# Patient Record
Sex: MALE | Race: WHITE
[De-identification: names, ages, dates, MRNs, and addresses within clinical notes are randomized per-mention and may not be internally consistent; named-entity substitution may affect disease eponyms.]

---

## 2019-01-15 ENCOUNTER — HOSPITAL ENCOUNTER (OUTPATIENT)
Dept: HOSPITAL 25 - OR | Age: 69
Discharge: HOME | End: 2019-01-15
Attending: NEUROLOGICAL SURGERY
Payer: MEDICARE

## 2019-01-15 VITALS — DIASTOLIC BLOOD PRESSURE: 61 MMHG | SYSTOLIC BLOOD PRESSURE: 116 MMHG

## 2019-01-15 DIAGNOSIS — R11.10: ICD-10-CM

## 2019-01-15 DIAGNOSIS — Z53.9: Primary | ICD-10-CM

## 2019-01-15 DIAGNOSIS — E66.2: ICD-10-CM

## 2019-01-15 NOTE — CONSULT
Consult


Consult: 





Mr. Marsh is a 69yo gentleman with PMH significant for smoking, SOB, 

morbid obesity, Pickwickian syndrome, and asthma who presented this morning for 

an L4-5, L5-S1 lumbar laminectomy.  I cancelled his case after he was vomiting 

in the per-op holding area.  In discussing my concerns with him and his family 

we discussed the pulmonologist's recommendation that he get a sleep study given 

his probable obesity hypoventilation syndrome and concern for opioid 

requirement and other potential respiratory depressants in the post operative 

period.  We discussed the potential complications of respiratory depressants in 

the setting of obesity hyperventilation syndrome including death.  It is 

documented in the pulmonology note that the pt had refused a sleep study 

because he did not want it to delay his surgery.  We then discussed his 

vomiting.  He attributed it to "nerves" and "car sickness".  Unable to be 

certain that this is not a prodrome of another illness which could further 

compromise his precarious respiratory status, I decided to cancel the case.  He 

was very upset.  And said that he was going to go home and shoot himself.  I 

asked him if he was serious, and recommended that he go to the ED and see 

psychiatrist if that really is his intent.  He refused and denied that that was 

really his intent.  I also recommended that he go see his PCP in the setting of 

his new onset vomiting.  Again, he refused and said that he was "done with 

doctors and just wanted to go home."





- Gricelda Forrester, DO

## 2019-02-05 ENCOUNTER — HOSPITAL ENCOUNTER (INPATIENT)
Dept: HOSPITAL 25 - OR | Age: 69
LOS: 2 days | Discharge: HOME | DRG: 517 | End: 2019-02-07
Attending: NEUROLOGICAL SURGERY | Admitting: NEUROLOGICAL SURGERY
Payer: MEDICARE

## 2019-02-05 DIAGNOSIS — Z80.0: ICD-10-CM

## 2019-02-05 DIAGNOSIS — R60.0: ICD-10-CM

## 2019-02-05 DIAGNOSIS — F17.210: ICD-10-CM

## 2019-02-05 DIAGNOSIS — E66.01: ICD-10-CM

## 2019-02-05 DIAGNOSIS — G47.33: ICD-10-CM

## 2019-02-05 DIAGNOSIS — Z82.49: ICD-10-CM

## 2019-02-05 DIAGNOSIS — Z90.89: ICD-10-CM

## 2019-02-05 DIAGNOSIS — M48.061: Primary | ICD-10-CM

## 2019-02-05 DIAGNOSIS — D69.6: ICD-10-CM

## 2019-02-05 DIAGNOSIS — R74.0: ICD-10-CM

## 2019-02-05 DIAGNOSIS — Z83.3: ICD-10-CM

## 2019-02-05 DIAGNOSIS — Z91.048: ICD-10-CM

## 2019-02-05 DIAGNOSIS — N40.0: ICD-10-CM

## 2019-02-05 DIAGNOSIS — Z80.6: ICD-10-CM

## 2019-02-05 DIAGNOSIS — I10: ICD-10-CM

## 2019-02-05 PROCEDURE — 82746 ASSAY OF FOLIC ACID SERUM: CPT

## 2019-02-05 PROCEDURE — 84155 ASSAY OF PROTEIN SERUM: CPT

## 2019-02-05 PROCEDURE — 01NR0ZZ RELEASE SACRAL NERVE, OPEN APPROACH: ICD-10-PCS | Performed by: NEUROLOGICAL SURGERY

## 2019-02-05 PROCEDURE — 80053 COMPREHEN METABOLIC PANEL: CPT

## 2019-02-05 PROCEDURE — 72100 X-RAY EXAM L-S SPINE 2/3 VWS: CPT

## 2019-02-05 PROCEDURE — 82607 VITAMIN B-12: CPT

## 2019-02-05 PROCEDURE — 36415 COLL VENOUS BLD VENIPUNCTURE: CPT

## 2019-02-05 PROCEDURE — 84165 PROTEIN E-PHORESIS SERUM: CPT

## 2019-02-05 PROCEDURE — 85025 COMPLETE CBC W/AUTO DIFF WBC: CPT

## 2019-02-05 PROCEDURE — 01NB0ZZ RELEASE LUMBAR NERVE, OPEN APPROACH: ICD-10-PCS | Performed by: NEUROLOGICAL SURGERY

## 2019-02-05 RX ADMIN — FENTANYL CITRATE PRN MCG: 0.05 INJECTION, SOLUTION INTRAMUSCULAR; INTRAVENOUS at 16:10

## 2019-02-05 RX ADMIN — HYDROCODONE BITARTRATE AND ACETAMINOPHEN PRN TAB: 5; 325 TABLET ORAL at 17:44

## 2019-02-05 RX ADMIN — FENTANYL CITRATE PRN MCG: 0.05 INJECTION, SOLUTION INTRAMUSCULAR; INTRAVENOUS at 16:25

## 2019-02-05 RX ADMIN — NICOTINE SCH: 21 PATCH TRANSDERMAL at 19:57

## 2019-02-05 RX ADMIN — HYDROCODONE BITARTRATE AND ACETAMINOPHEN PRN TAB: 5; 325 TABLET ORAL at 21:49

## 2019-02-05 RX ADMIN — FENTANYL CITRATE PRN MCG: 0.05 INJECTION, SOLUTION INTRAMUSCULAR; INTRAVENOUS at 17:32

## 2019-02-05 RX ADMIN — FENTANYL CITRATE PRN MCG: 0.05 INJECTION, SOLUTION INTRAMUSCULAR; INTRAVENOUS at 17:01

## 2019-02-05 RX ADMIN — FENTANYL CITRATE PRN MCG: 0.05 INJECTION, SOLUTION INTRAMUSCULAR; INTRAVENOUS at 16:15

## 2019-02-06 RX ADMIN — LOSARTAN POTASSIUM SCH MG: 25 TABLET, FILM COATED ORAL at 08:37

## 2019-02-06 RX ADMIN — HYDROCODONE BITARTRATE AND ACETAMINOPHEN PRN TAB: 5; 325 TABLET ORAL at 18:18

## 2019-02-06 RX ADMIN — TAMSULOSIN HYDROCHLORIDE SCH MG: 0.4 CAPSULE ORAL at 08:37

## 2019-02-06 RX ADMIN — NICOTINE SCH: 21 PATCH TRANSDERMAL at 08:37

## 2019-02-06 RX ADMIN — DOCUSATE SODIUM SCH MG: 100 CAPSULE, LIQUID FILLED ORAL at 08:37

## 2019-02-06 RX ADMIN — HYDROCODONE BITARTRATE AND ACETAMINOPHEN PRN TAB: 5; 325 TABLET ORAL at 05:46

## 2019-02-06 RX ADMIN — FUROSEMIDE SCH MG: 40 TABLET ORAL at 08:37

## 2019-02-06 NOTE — PN
Subjective


Date of Service: 02/06/19


Interval History: 





Back pain much better since surgery.  No new c/o.





Objective


Active Medications: 








Acetaminophen (Tylenol Tab*)  650 mg PO Q4H PRN


   PRN Reason: PAIN


Hydrocodone Bitart/Acetaminophen (Norco 5-325 Tab*)  2 tab PO Q4H PRN


   PRN Reason: marked pain


   Last Admin: 02/06/19 05:46 Dose:  2 tab


Docusate Sodium (Colace Cap*)  [facility]


   Last Admin: 02/06/19 08:37 Dose:  200 mg


Furosemide (Lasix Tab*)  40 [facility]


   Last Admin: 02/06/19 08:37 Dose:  40 mg


Lactated Ringer's (Lactated Ringers 1000 Ml Bag*)  1,000 mls @ 75 mls/hr IV 

.per rate [facility]


   Last Admin: 02/05/19 21:51 Dose:  75 mls/hr


Losartan Potassium (Cozaar Tab*)  50 [facility]


   Last Admin: 02/06/19 08:37 Dose:  50 mg


Magnesium Hydroxide (Milk Of Magnesia Liq*)  30 ml PO DAILY PRN


   PRN Reason: CONSTIPATION


Morphine Sulfate (Morphine Inj ((Syringe))*)  3 mg IV Q4H PRN


   PRN Reason: PAIN - MILD


Nicotine (Nicotine Inhaler*)  10 mg INH Q2H PRN


   PRN Reason: CRAVING


Nicotine (Nicotine Patch 21 Mg/24 Hr*)  1 patch TRANSDERM DAILY [facility]


   Last Admin: 02/06/19 08:37 Dose:  Not Given


Ondansetron HCl (Zofran Inj*)  4 mg IV Q6H PRN


   PRN Reason: NAUSEA/VOMITING


Pharmacy Profile Note (Nicotine Patch Removal Note*)  1 note PATCH OFF 2100 [facility]


Polyethylene Glycol/Electrolytes (Miralax*)  17 gm PO QAM PRN


   PRN Reason: CONSTIPATION


Tamsulosin HCl (Flomax Cap*)  0.4 mg [facility]


   Last Admin: 02/06/19 08:37 Dose:  0.4 mg








 Vital Signs - 8 hr











  02/06/19 02/06/19 02/06/19





  07:18 08:00 08:24


 


Temperature 98.0 F  


 


Pulse Rate 78  


 


Respiratory 20 18 18





Rate   


 


Blood Pressure 128/58  





(mmHg)   


 


O2 Sat by Pulse 99  





Oximetry   














  02/06/19





  12:27


 


Temperature 98.1 F


 


Pulse Rate 73


 


Respiratory 20





Rate 


 


Blood Pressure 123/46





(mmHg) 


 


O2 Sat by Pulse 98





Oximetry 











Oxygen Devices in Use Now: None


Appearance: Alert, in recliner chair.  In good spirits.  Looks comfortable.


Eyes: No Scleral Icterus


Neurological: Alert and Oriented x 3, NL Sensation





Assess/Plan/Problems-Billing


Assessment: 











- Patient Problems


(1) HTN (hypertension)


Current Visit: Yes   Status: Acute   Code(s): I10 - ESSENTIAL (PRIMARY) 

HYPERTENSION   SNOMED Code(s): 55854793


   Comment: Continue losartan home dose.    





(2) ABBEY (obstructive sleep apnea)


Current Visit: Yes   Status: Acute   Code(s): G47.33 - OBSTRUCTIVE SLEEP APNEA (

ADULT) (PEDIATRIC)   SNOMED Code(s): 25248276


   Comment: Not a definite diagnosis--has not yet had sleep lab study.   





(3) Morbid obesity


Current Visit: Yes   Status: Acute   Code(s): E66.01 - MORBID (SEVERE) OBESITY 

DUE TO EXCESS CALORIES   SNOMED Code(s): 329539702


   Comment: BMI 53.1.   





(4) Acquired thrombocytopenia


Current Visit: Yes   Status: Acute   Code(s): D69.6 - THROMBOCYTOPENIA, 

UNSPECIFIED   SNOMED Code(s): 06524932


   Comment: Since at least 6/2018.  Patient not aware of this diagnosis, has 

not seen a hematologist.  CBC, B12, folate level 2/7.  Note .   





(5) Elevated transaminase level


Current Visit: Yes   Status: Acute   Code(s): R74.0 - NONSPEC ELEV OF LEVELS OF 

TRANSAMNS & LACTIC ACID DEHYDRGNSE   SNOMED Code(s): 846228460


   Comment: Mild elevation 2018.  Repeat CMP 2/7.

## 2019-02-06 NOTE — CONS
[facility] MEDICINE CONSULTATION REPORT:

 

DATE OF CONSULT:  02/05/19

 

PROVIDER:  Tona Garza NP

 

ATTENDING PHYSICIAN:  Dr. Garcia.

 

CONSULTING PHYSICIAN:  Dr. Santosh Kimble (dictated by Tona Garza NP)

 

REASON FOR CONSULT:  Co-management of chronic medical conditions.

 

HISTORY OF PRESENT ILLNESS:  Mr. Marsh is a 68-year-old male with a past 
medical history significant for hypertension, obstructive sleep apnea, enlarged 
prostate, lumbar stenosis, morbid obesity, and tobacco abuse, who presented to 
[facility] [place] for an elective lumbar laminectomy with Dr. Garcia.  For complete details, 
please see dictated H and P from Dr. Garcia and ALEJANDRO Rader.  In brief, 
the patient had ongoing pain and weakness in his lower extremities and opted 
for an elective lumbar laminectomy with Dr. Garcia.  Due to his history of 
hypertension, morbid obesity, and BPH, we were asked to see and evaluate him to 
help co-manage his chronic medical conditions.

 

PAST MEDICAL HISTORY:

1.  Hypertension.

2.  Obstructive sleep apnea.

3.  BPH.

4.  Lower extremity edema.

5.  Lumbar stenosis.

6.  Morbid obesity.

 

PAST SURGICAL HISTORY:

1.  Tonsillectomy.

2.  Lumbar surgery in 1996 and 2011.

3.  Cervical surgery in 2015.

4.  Appendectomy in 1980.

5.  Left ear surgery.

 

HOME MEDICATIONS:

1.  Tamsulosin 0.4 mg p.o. q.a.m.

2.  MiraLAX 17 g p.o. q.a.m. as needed.

3.  Oxycodone/acetaminophen 1 tablet p.o. q.6 hours as needed.

4.  Zofran 4 mg p.o. daily p.r.n. nausea or vomiting.

5.  Losartan 50 mg p.o. q.a.m.

6.  Ibuprofen 800 mg p.o. q.6 hours as needed for pain.

7.  Furosemide 20 mg p.o. q.a.m.

8.  Docusate 200 mg p.o. q.a.m.

 

ALLERGIES:  Allergy to ADHESIVE TAPE.

 

FAMILY HISTORY:  Father with a history of an MI.  Mother with a history of CHF. 
Father and sister both with diabetes and cancer.  Mother with colon cancer.  
Father with leukemia and sister with pancreatic cancer.

 

SOCIAL HISTORY:  The patient smokes 1 pack per day.  He uses alcohol rarely. 
Denies any illicit drug use.  He is .  Surrogate decision maker in the 
event he is unable to make his own decisions is his wife.  Code status, he is a 
full code.

 

REVIEW OF SYSTEMS:  General:  There is no fever, chills, or unintended weight 
loss. He denies any chest pain or edema.  Denies any cough, hemoptysis, or 
shortness of breath.  Denies any nausea, vomiting, or diarrhea.  Denies any 
abdominal pain. Denies any gross hematuria or dysuria.  Neurologic:  He denies 
any weakness or sensory loss.  Denies any dysphagia.  He does complain of lower 
back pain.  No rashes, lesions, or open sores.  Denies any depression or 
anxiety.

 

PHYSICAL EXAM:  Vital Signs:  Blood pressure 126/61, heart rate 70, 
respirations are 21, O2 saturation 96% on room air, temperature is 98.5.  HEENT
:  Head is atraumatic, normocephalic.  Eyes:  EOMs are intact.  Sclerae 
anicteric and not pale.  Oral mucosa appeared to be moist.  Neck is supple.  
Lungs are clear to auscultation bilaterally.  No wheezes, rales, or rhonchi.  
Cardiac:  S1, S2. Regular rate and rhythm.  No murmurs, rubs, or gallops.  
Abdomen is rounded, obese, soft.  Bowel sounds are present x4.  Extremities:  
Pedal pulses are +2 bilaterally. He is able to move all 4 extremities with 5/5 
strength.  Skin:  He does have a dressing to his lower lumbar spine.  Neurologic
:  He is drowsy sitting in a chair in the recovery room.  He does not appear to 
be in any acute distress.

 

LABORATORY DATA:  Lab work from 01/29/19, WBCs are ______, RBCs 3.85, 
hemoglobin was 13.5, hematocrit was 39, platelet count was 100.  Sodium of 139, 
potassium of 4.0, chloride 110, carbon dioxide was 22, anion gap was 7, BUN was 
16, creatinine 0.92, glucose was 95, calcium 9.0.

 

IMPRESSION AND PLAN:  Mr. Marsh is a 68-year-old male with a past medical 
history significant for hypertension, benign prostatic hypertrophy, lower 
extremity edema, morbid obesity, lumbar stenosis who presented to [facility] [place] for an 
elective lumbar laminectomy with Dr. Garcia.  In the immediate postoperative 
period, the patient has no complaints.  Our recommendations are as follows:

 

1.  Status post lumbar laminectomy.  Management per Neurosurgery.  PT/OT per 
Neurosurgery.  Pain Management per Neurosurgery.

2. Hypertension. I will continue on losartan as previously prescribed with 
holding parameters with systolic blood pressure less than 120.

3.  Lower extremity edema.  He can continue on Lasix 40 mg p.o. q.a.m.

4.  Benign prostatic hypertrophy.  He should continue on Flomax 0.4 mg p.o. 
q.a.m. as previously prescribed.

5.  Tobacco abuse.  The patient can have nicotine inhaler and nicotine patch.

6.  FEN.  The patient should have a heart-healthy, caffeine okay diet.

7.  Code status.  He is a full code.

8.  DVT prophylaxis, per Neurosurgery.

 

TIME SPENT:  Time spent on this consultation was 45 minutes, greater than half 
that time was spent at the bedside reviewing events leading thus far to his 
hospitalization, performing my physical exam, and reviewing my plan of care, 
the other half of the time was spent implementing my plan of care.

 

I have discussed this with my attending, Dr. Santosh Kimble; he is in agreement with 
my plan.

 

____________________________________ TONA GARZA, NP

 

[id number]/[facility] #: [id number]

[place]

## 2019-02-06 NOTE — PN
Progress Note





- Progress Note


Date of Service: 02/06/19


SOAP: 


Subjective:


[]POD # 1


C/O noise from monitors last night


Legs much better than pre op


Moderate incisional pain








Objective:


[]Dressing dry


Moderate drain output


Neuro intact








Assessment:


[]Improved








Plan:


[]


Post op drain output requires further monitoring

## 2019-02-07 VITALS — DIASTOLIC BLOOD PRESSURE: 65 MMHG | SYSTOLIC BLOOD PRESSURE: 125 MMHG

## 2019-02-07 LAB
ALBUMIN SERPL BCG-MCNC: 3 G/DL (ref 3.2–5.2)
ALBUMIN/GLOB SERPL: 1 {RATIO} (ref 1–3)
ALP SERPL-CCNC: 57 U/L (ref 34–104)
ALT SERPL W P-5'-P-CCNC: 162 U/L (ref 7–52)
ANION GAP SERPL CALC-SCNC: 5 MMOL/L (ref 2–11)
AST SERPL-CCNC: 211 U/L (ref 13–39)
BASOPHILS # BLD AUTO: 0 10^3/UL (ref 0–0.2)
BUN SERPL-MCNC: 31 MG/DL (ref 6–24)
BUN/CREAT SERPL: 35.6 (ref 8–20)
CALCIUM SERPL-MCNC: 8.4 MG/DL (ref 8.6–10.3)
CHLORIDE SERPL-SCNC: 108 MMOL/L (ref 101–111)
EOSINOPHIL # BLD AUTO: 0.2 10^3/UL (ref 0–0.6)
FOLATE SERPL-MCNC: 15.53 NG/ML (ref 3.99–?)
GLOBULIN SER CALC-MCNC: 3 G/DL (ref 2–4)
GLUCOSE SERPL-MCNC: 110 MG/DL (ref 70–100)
HCO3 SERPL-SCNC: 22 MMOL/L (ref 22–32)
HCT VFR BLD AUTO: 32 % (ref 42–52)
HGB BLD-MCNC: 10.9 G/DL (ref 14–18)
LYMPHOCYTES # BLD AUTO: 1.4 10^3/UL (ref 1–4.8)
MCH RBC QN AUTO: 35 PG (ref 27–31)
MCHC RBC AUTO-ENTMCNC: 34 G/DL (ref 31–36)
MCV RBC AUTO: 102 FL (ref 80–94)
MONOCYTES # BLD AUTO: 0.7 10^3/UL (ref 0–0.8)
NEUTROPHILS # BLD AUTO: 4.7 10^3/UL (ref 1.5–7.7)
NRBC # BLD AUTO: 0 10^3/UL
NRBC BLD QL AUTO: 0.1
PLATELET # BLD AUTO: 74 10^3/UL (ref 150–450)
POTASSIUM SERPL-SCNC: 4 MMOL/L (ref 3.5–5)
PROT SERPL-MCNC: 6 G/DL (ref 6.4–8.9)
RBC # BLD AUTO: 3.11 10^6/UL (ref 4–5.4)
SODIUM SERPL-SCNC: 135 MMOL/L (ref 135–145)
WBC # BLD AUTO: 7.1 10^3/UL (ref 3.5–10.8)

## 2019-02-07 RX ADMIN — TAMSULOSIN HYDROCHLORIDE SCH MG: 0.4 CAPSULE ORAL at 08:32

## 2019-02-07 RX ADMIN — FUROSEMIDE SCH MG: 40 TABLET ORAL at 08:32

## 2019-02-07 RX ADMIN — DOCUSATE SODIUM SCH MG: 100 CAPSULE, LIQUID FILLED ORAL at 08:32

## 2019-02-07 RX ADMIN — LOSARTAN POTASSIUM SCH MG: 25 TABLET, FILM COATED ORAL at 08:32

## 2019-02-07 RX ADMIN — NICOTINE SCH: 21 PATCH TRANSDERMAL at 08:09

## 2019-02-07 NOTE — PN
Subjective


Date of Service: 02/07/19


Interval History: 





Feeling well, good pain control, anxious to go home. 





Objective


Active Medications: 








Acetaminophen (Tylenol Tab*)  650 mg PO Q4H PRN


   PRN Reason: PAIN


Hydrocodone Bitart/Acetaminophen (Norco 5-325 Tab*)  2 tab PO Q4H PRN


   PRN Reason: marked pain


   Last Admin: 02/06/19 18:18 Dose:  2 tab


Docusate Sodium (Colace Cap*)  [facility] [place] [facility]


   Last Admin: 02/07/19 08:32 Dose:  200 mg


Furosemide (Lasix Tab*)  40 [facility] PO [facility] [place] [facility]


   Last Admin: 02/07/19 08:32 Dose:  40 mg


Lactated Ringer's (Lactated Ringers 1000 Ml Bag*)  1,000 mls @ 75 mls/hr IV 

.per rate [facility]


   Last Admin: 02/05/19 21:51 Dose:  75 mls/hr


Losartan Potassium (Cozaar Tab*)  50 mg PO [facility]


   Last Admin: 02/07/19 08:32 Dose:  50 mg


Magnesium Hydroxide (Milk Of Magnesia Liq*)  30 ml PO DAILY PRN


   PRN Reason: CONSTIPATION


Morphine Sulfate (Morphine Inj ((Syringe))*)  3 mg IV Q4H PRN


   PRN Reason: PAIN - MILD


Nicotine (Nicotine Inhaler*)  10 mg INH Q2H PRN


   PRN Reason: CRAVING


Nicotine (Nicotine Patch 21 Mg/24 Hr*)  1 patch TRANSDERM DAILY [facility]


   Last Admin: 02/07/19 08:09 Dose:  Not Given


Ondansetron HCl (Zofran Inj*)  4 mg IV Q6H PRN


   PRN Reason: NAUSEA/VOMITING


Pharmacy Profile Note (Nicotine Patch Removal Note*)  1 note PATCH OFF 2100 [facility]


   Last Admin: 02/06/19 22:05 Dose:  Not Given


Polyethylene Glycol/Electrolytes (Miralax*)  17 gm PO QAM PRN


   PRN Reason: CONSTIPATION


Tamsulosin HCl (Flomax Cap*)  0.4 mg PO [facility]


   Last Admin: 02/07/19 08:32 Dose:  0.4 mg








 Vital Signs - 8 hr











  02/07/19 02/07/19





  04:23 07:47


 


Temperature 98.0 F 97.8 F


 


Pulse Rate 67 80


 


Respiratory 16 16





Rate  


 


Blood Pressure 120/53 125/65





(mmHg)  


 


O2 Sat by Pulse 99 98





Oximetry  











Oxygen Devices in Use Now: None


Appearance: Alert, in a chair.  In good spirits.  Looks comfortable.


Neurological: Alert and Oriented x 3, NL Sensation


Result Diagrams: 


 02/07/19 05:08





 02/07/19 05:08





Assess/Plan/Problems-Billing


Assessment: 











- Patient Problems


(1) HTN (hypertension)


Current Visit: Yes   Status: Acute   Code(s): I10 - ESSENTIAL (PRIMARY) 

HYPERTENSION   SNOMED Code(s): 31336254


   Comment: Continue losartan home dose.    





(2) ABBYE (obstructive sleep apnea)


Current Visit: Yes   Status: Acute   Code(s): G47.33 - OBSTRUCTIVE SLEEP APNEA (

ADULT) (PEDIATRIC)   SNOMED Code(s): 26337607


   Comment: Not a definite diagnosis--has not yet had sleep lab study.   





(3) Morbid obesity


Current Visit: Yes   Status: Acute   Code(s): E66.01 - MORBID (SEVERE) OBESITY 

DUE TO EXCESS CALORIES   SNOMED Code(s): 499684116


   Comment: BMI 53.1.   





(4) Acquired thrombocytopenia


Current Visit: Yes   Status: Acute   Code(s): D69.6 - THROMBOCYTOPENIA, 

UNSPECIFIED   SNOMED Code(s): 77392858


   Comment: Since at least 6/2018.  Patient not aware of this diagnosis, has 

not seen a hematologist.  B12 and folate level 2/7 both OK.  Note .  Pt 

advised to ask his PCP about referral to a hematologist.  SPEP pending.   Pt 

prefers to see a hematologist in [place].    





(5) Elevated transaminase level


Current Visit: Yes   Status: Acute   Code(s): R74.0 - NONSPEC ELEV OF LEVELS OF 

TRANSAMNS & LACTIC ACID DEHYDRGNSE   SNOMED Code(s): 213343080


   Comment: Higher on 2/7/19.  Pt advised he needs fup for this with his PCP 

also.

## 2019-02-07 NOTE — OP
DATE OF OPERATION:  02/05/19 - ROOM #[id number]

 

DATE OF BIRTH:  [birth year]

 

PRIMARY SURGEON:  Chris Garcia MD.

 

FIRST ASSISTANT:  ALEJANDRO Rader.

 

ANESTHESIA:  General.

 

PRE-OP DIAGNOSIS:  Lumbar spinal stenosis, L4-5, L5-S1.

 

POST-OP DIAGNOSIS:  Lumbar spinal stenosis, L4-5, L5-S1.

 

OPERATIVE PROCEDURE:  Lumbar decompressive laminectomy, L4-5, L5-S1 with 
bilateral foraminotomies, L4-5, 

L5-S1.

 

DESCRIPTION OF PROCEDURE:  After satisfactory general anesthesia was obtained, 
the patient was placed on the operating table in the prone position with the 
chest supported on the Demetrius frame and the back slightly flexed.  The lumbar 
region was then clipped, prepped, and draped in a sterile manner for lumbar 
laminectomy and a skin incision outlined from L4 to the sacrum.  The patient 
was morbidly obese and the dissection was carried down through multiple layers 
of adipose tissue until the fascia was encountered.  The fascia was then 
divided along the spinous processes from L4 to the sacrum and the paraspinal 
musculature stripped away from these posterior elements using the periosteal 
elevator and monopolar cautery.  An intraoperative x-ray was obtained, 
verifying proper interspace localization, after which decompression was 
initially carried out at the L4-5 level upon removing the spinous process of L4 
as well as the spinous process of L5 with a combination of a Reed  
and a Leksell rongeur.  Utilizing the Midas Jc drill, the remaining portion of 
the base of the spinous process of L4 and medial aspect of facet complex was 
thinned out and a decompression carried out with Kerrison rongeurs. This was 
carried superiorly until the attachment of ligamentum flavum was taken down.  
The ligamentum flavum was then removed with the Kerrison as well.  This was 
carried out inferiorly and laterally until generous foraminotomies have been 
carried out over both L5 nerve roots.  In a similar manner, a decompression was 
carried out at L5-S1.  The findings at the L5-S1 level were much more 
significant.  He had much more bony hypertrophy as well as ligamentous 
thickening contributing to severe S1 nerve root compromise.  Utilizing the 
Midas Jc drill, the bony structures were thinned out and a decompression 
carried out.  This was extended inferiorly and laterally until both S1 nerve 
roots were noted to be free in their course.  After assuring adequate hemostasis
, the wound was thoroughly irrigated, after which Gelfoam was placed over the 
laminectomy defect.  A drain was then placed in the epidural space and tunneled 
out towards the left side.  The fascia was then reapproximated with 0 Vicryl 
sutures, the subcutaneous tissues closed with 0 and 3-0 Vicryl sutures and the 
skin closed with skin clips.  The estimated blood loss was 100 cc and the final 
sponge, padding and needle counts were correct.  The patient was taken to the 
recovery room, extubated and in stable condition.

 

 [id number]/[facility] #: [id number]

[facility]

## 2019-02-09 LAB
ALBUMIN SERPL-MCNC: 2.6 G/DL (ref 3.4–4.7)
ALBUMIN/GLOB SERPL: 0.74 {RATIO}
GAMMA GLOB SERPL ELPH-MCNC: 1.9 G/DL (ref 0.6–1.6)
PROT SERPL-MCNC: 6.1 G/DL (ref 6.3–7.9)